# Patient Record
(demographics unavailable — no encounter records)

---

## 2025-03-19 NOTE — PLAN
[FreeTextEntry1] : Plan is to proceed with right thyroidectomy in the next month.  Will have her cardiologist weigh in about the risk of the thoracic aneurysm I would say not get any less so significant as she ages  Will plan for presurgical testing and operation on the second half of April

## 2025-03-19 NOTE — HISTORY OF PRESENT ILLNESS
[de-identified] : Patient is a 36-year-old woman who has a history of enlarging right thyroid nodule.  Patient had a FNA biopsy that demonstrated [place] category III.  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  She reports that send 4 cm range she had a cholecystectomy 4 years ago laparoscopically at [facility]  All of her previous thyroid imaging is at [facility]  She has no ionizing radiation she is not on any thyroid medication

## 2025-03-19 NOTE — DATA REVIEWED
[FreeTextEntry1] : I reviewed her ultrasound and FNA results both with her and previously in direct view

## 2025-03-19 NOTE — REASON FOR VISIT
[Consultation] : a consultation visit [FreeTextEntry1] : This patient is referred by Dr. Hellerman for further evaluation  enlarging right thyroid mass

## 2025-03-19 NOTE — ASSESSMENT
[FreeTextEntry1] : Patient has a enlarging right-sided thyroid mass that has atypical cytology but no genetic abnormalities  She does not want to continue surveillance with intermittent fine-needle aspiration/she has a lot of cancer in her family and this is enlarging in size she has a young daughter and does not want to have any issues with her thyroid we talked about a right thyroidectomy and I think she would be a reasonable candidate for this  She wanted to know why relieving the left side in and I told him that they reasons were myriad and that she does not have any abnormalities on that side even if the right side is cancer a hemithyroidectomy is treatment enough unless there is an unusual variant or high risk features tumor itself but there are no genetic abnormalities  Also by only doing 1 side we lessen the risk of the bilateral nerve injury and postoperative hypoparathyroidism   During this visit we had at least a 30-minute discussion just on thyroid surgery itself.  For most tumors that are less than 4 cm the initial treatment plan is a total thyroid lobectomy on the side where the tumor is.  This involves removing the entire affected lobe and isthmus of the thyroid.  This conservative approach preserves the contralateral side of the thyroid to provide normal thyroid gland function and decreases the need for postoperative thyroid medication.  One sided surgery also decreases this incidence of some of the complications associated with total thyroidectomy that are discussed in detail below.  Intraoperatively, based on findings of extrathyroidal extension or lymph node involvement there is a chance perform a total thyroid lobectomy at the initial operation.   The risks of thyroid surgery were discussed and include bleeding and hematoma formation in the early postoperative period that may result in early reoperation, but the chance that this is 1% or less.  Patient can have the recurrent laryngeal nerve injured on the affected side that can affect the voice.  The incidence of transient nerve dysfunction after thyroidectomy is 3 to 5%, but most, 99%, of patients with perioperative hoarseness have improved to normal function in 2 to 6 weeks after surgery.  Permanent injury of recurrent laryngeal nerve after thyroid surgery is about 1%, this is treatable by procedures often performed by ear nose and throat specialists that focus on voice.  Injury to both the recurrent laryngeal nerves during a total thyroidectomy is extremely rare and rarely involves placement of a tracheostomy tube to assist with breathing. This happens less than .1% of the time (1 in a 1000 cases) .    There is the chance of injury to the Parathyroid Glands during thyroid surgery. These are four small glands associated with the posterior aspect of the thyroid that control the body's blood calcium levels.  Care is taken to identify and preserve the small glands during the surgery, but in some cases of total thyroidectomy there is transient dysfunction of these glands and patients can have low calcium levels immediately post op.  The incidence of postoperative parathyroid dysfunction manifesting in decreased calcium levels is seen after total thyroidectomy is about 10%.  This transient decrease in calcium is treated in all patients with postoperative Tums , which is 500 mg calcium carbonate 2 tablets 4 times a day, for the first week after surgery, with or without the addition of Rocaltrol (which is oral vitamin D).  These medications get slowly tapered off in the postoperative period and calcium levels in the blood are usually normal by second postoperative visit at 4-5 weeks after surgery.    The surgery itself takes between One and Three hours based on extent of surgery and occurs through a small transverse mid midline neck incision.  All operations are done under general endotracheal anesthesia with intraoperative nerve monitoring through the endotracheal tube. Most patients can be discharged after 3 hours of observation the same day of surgery however in most cases of total thyroidectomy the patient is kept overnight for monitoring and calcium checks.  In the usual postoperative course, the patient is left with a small suture in their neck that needs to be removed between 5 to 7 days after surgery for the best cosmetic result.  We also see the patient back at 4-5 weeks after surgery to make sure further treatment is planned if needed, as well as to discuss pathology, check the wound, and adjust any medications.  In the setting of thyroid lobectomy on one side, the incidence of requiring postoperative thyroid medication is 10 to 15%.  If a total thyroidectomy is performed thyroid medication will be needed in almost all cases.  If total thyroidectomy is performed, you will be started on a weight-based dose of Synthroid in the early postoperative period.   Permanant pathologic results are usually available between 7-10 days after surgery.  If the lesion has high risk features on permanent pathology, we may suggest returning to the operating room in the early postoperative period between 1 and 2 weeks to complete the thyroidectomy to allow the option for postoperative radioactive iodine treatment.  The presence of a remnant thyroid makes postoperative radioactive iodine treatment much more difficult and also the presence of remnant thyroid does not allow us to monitor Thyroglobulin (Tg level) postoperatively for tumor recurrence.  The discussion of radioactive iodine and thyroglobulin monitoring will be discussed at length postoperatively based on pathologic findings.   All of the above was discussed with the patient and informed consent for the operation was obtained based on the above discussion and the patients acknowledgement of understanding of the risks and benefits of the proposed procedure.

## 2025-03-19 NOTE — PHYSICAL EXAM
[de-identified] : Looks well animated no complaints [de-identified] : No exophthalmos [de-identified] : Neck is asymmetric with right side enlarged visually palpable mass l.  There are no lymph nodes  palpable in the neck

## 2025-05-05 NOTE — REASON FOR VISIT
[Post Op: _________] : a [unfilled] post op visit [FreeTextEntry1] : Patient's status post right total thyroid lobectomy last week

## 2025-05-05 NOTE — HISTORY OF PRESENT ILLNESS
[de-identified] : Patient has been doing well since surgery her voice is strong.  She had some fullness in her throat at the beginning of the postoperative period but it has resolved and she currently is feeling fine  Her wound is well-healed suture was removed without incident

## 2025-05-05 NOTE — ASSESSMENT
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